# Patient Record
Sex: MALE | Race: BLACK OR AFRICAN AMERICAN | URBAN - METROPOLITAN AREA
[De-identification: names, ages, dates, MRNs, and addresses within clinical notes are randomized per-mention and may not be internally consistent; named-entity substitution may affect disease eponyms.]

---

## 2023-07-05 ENCOUNTER — APPOINTMENT (OUTPATIENT)
Dept: CT IMAGING | Age: 33
End: 2023-07-05
Attending: NURSE PRACTITIONER
Payer: COMMERCIAL

## 2023-07-05 ENCOUNTER — TELEPHONE (OUTPATIENT)
Dept: HEMATOLOGY/ONCOLOGY | Facility: HOSPITAL | Age: 33
End: 2023-07-05

## 2023-07-05 ENCOUNTER — HOSPITAL ENCOUNTER (OUTPATIENT)
Age: 33
Discharge: HOME | End: 2023-07-05
Payer: COMMERCIAL

## 2023-07-05 VITALS
DIASTOLIC BLOOD PRESSURE: 76 MMHG | HEART RATE: 61 BPM | TEMPERATURE: 98 F | BODY MASS INDEX: 29.16 KG/M2 | OXYGEN SATURATION: 100 % | WEIGHT: 220 LBS | SYSTOLIC BLOOD PRESSURE: 121 MMHG | HEIGHT: 73 IN | RESPIRATION RATE: 16 BRPM

## 2023-07-05 DIAGNOSIS — R16.0 MASS OF MULTIPLE SITES OF LIVER: Primary | ICD-10-CM

## 2023-07-05 LAB
#MXD IC: 1 X10ˆ3/UL (ref 0.1–1)
BASOPHILS # BLD AUTO: 0.09 X10(3) UL (ref 0–0.2)
BASOPHILS NFR BLD AUTO: 1.4 %
BUN BLD-MCNC: 14 MG/DL (ref 7–18)
CHLORIDE BLD-SCNC: 103 MMOL/L (ref 98–112)
CO2 BLD-SCNC: 25 MMOL/L (ref 21–32)
CREAT BLD-MCNC: 1 MG/DL
EOSINOPHIL # BLD AUTO: 0.13 X10(3) UL (ref 0–0.7)
EOSINOPHIL NFR BLD AUTO: 2 %
ERYTHROCYTE [DISTWIDTH] IN BLOOD BY AUTOMATED COUNT: 14.4 %
GFR SERPLBLD BASED ON 1.73 SQ M-ARVRAT: 102 ML/MIN/1.73M2 (ref 60–?)
GLUCOSE BLD-MCNC: 88 MG/DL (ref 70–99)
HCT VFR BLD AUTO: 34.8 %
HCT VFR BLD AUTO: 37.1 %
HCT VFR BLD CALC: 38 %
HGB BLD-MCNC: 11.4 G/DL
HGB BLD-MCNC: 11.4 G/DL
IMM GRANULOCYTES # BLD AUTO: 0.01 X10(3) UL (ref 0–1)
IMM GRANULOCYTES NFR BLD: 0.2 %
ISTAT IONIZED CALCIUM FOR CHEM 8: 1.31 MMOL/L (ref 1.12–1.32)
LYMPHOCYTES # BLD AUTO: 1.8 X10ˆ3/UL (ref 1–4)
LYMPHOCYTES # BLD AUTO: 1.93 X10(3) UL (ref 1–4)
LYMPHOCYTES NFR BLD AUTO: 27.8 %
LYMPHOCYTES NFR BLD AUTO: 30.1 %
MCH RBC QN AUTO: 26 PG (ref 26–34)
MCH RBC QN AUTO: 26.6 PG (ref 26–34)
MCHC RBC AUTO-ENTMCNC: 30.7 G/DL (ref 31–37)
MCHC RBC AUTO-ENTMCNC: 32.8 G/DL (ref 31–37)
MCV RBC AUTO: 81.1 FL
MCV RBC AUTO: 84.5 FL (ref 80–100)
MIXED CELL %: 15 %
MONOCYTES # BLD AUTO: 0.83 X10(3) UL (ref 0.1–1)
MONOCYTES NFR BLD AUTO: 12.9 %
NEUTROPHILS # BLD AUTO: 3.43 X10 (3) UL (ref 1.5–7.7)
NEUTROPHILS # BLD AUTO: 3.43 X10(3) UL (ref 1.5–7.7)
NEUTROPHILS # BLD AUTO: 3.7 X10ˆ3/UL (ref 1.5–7.7)
NEUTROPHILS NFR BLD AUTO: 53.4 %
NEUTROPHILS NFR BLD AUTO: 57.2 %
PLATELET # BLD AUTO: 352 10(3)UL (ref 150–450)
POCT BILIRUBIN URINE: NEGATIVE
POCT BLOOD URINE: NEGATIVE
POCT GLUCOSE URINE: NEGATIVE MG/DL
POCT KETONE URINE: NEGATIVE MG/DL
POCT LEUKOCYTE ESTERASE URINE: NEGATIVE
POCT NITRITE URINE: NEGATIVE
POCT PH URINE: 6 (ref 5–8)
POCT PROTEIN URINE: NEGATIVE MG/DL
POCT SPECIFIC GRAVITY URINE: 1.02
POCT URINE CLARITY: CLEAR
POCT URINE COLOR: YELLOW
POCT UROBILINOGEN URINE: 1 MG/DL
POTASSIUM BLD-SCNC: 4 MMOL/L (ref 3.6–5.1)
RBC # BLD AUTO: 4.29 X10(6)UL
RBC # BLD AUTO: 4.39 X10ˆ6/UL
SODIUM BLD-SCNC: 141 MMOL/L (ref 136–145)
WBC # BLD AUTO: 6.4 X10(3) UL (ref 4–11)
WBC # BLD AUTO: 6.5 X10ˆ3/UL (ref 4–11)

## 2023-07-05 PROCEDURE — 96360 HYDRATION IV INFUSION INIT: CPT

## 2023-07-05 PROCEDURE — 96361 HYDRATE IV INFUSION ADD-ON: CPT

## 2023-07-05 PROCEDURE — 99205 OFFICE O/P NEW HI 60 MIN: CPT

## 2023-07-05 PROCEDURE — 80047 BASIC METABLC PNL IONIZED CA: CPT

## 2023-07-05 PROCEDURE — 81002 URINALYSIS NONAUTO W/O SCOPE: CPT | Performed by: NURSE PRACTITIONER

## 2023-07-05 PROCEDURE — 85025 COMPLETE CBC W/AUTO DIFF WBC: CPT | Performed by: NURSE PRACTITIONER

## 2023-07-05 PROCEDURE — 74177 CT ABD & PELVIS W/CONTRAST: CPT | Performed by: NURSE PRACTITIONER

## 2023-07-05 RX ORDER — SODIUM CHLORIDE 9 MG/ML
1000 INJECTION, SOLUTION INTRAVENOUS ONCE
Status: COMPLETED | OUTPATIENT
Start: 2023-07-05 | End: 2023-07-05

## 2023-07-05 NOTE — DISCHARGE INSTRUCTIONS
As we discussed Dr. Brittani Becker office should be reaching out to the next 1 to 2 days to set up an appointment for further follow-up. If you do not hear from her or the office in that time be sure to call the office. Do not drink alcohol or take any Tylenol.

## 2023-07-05 NOTE — TELEPHONE ENCOUNTER
----- Message from Mel Gomez MD sent at 7/5/2023  2:36 PM CDT -----  Was called by the ED. This jeremy needs to see one of the GI guys.  Thanks

## 2023-07-05 NOTE — ED INITIAL ASSESSMENT (HPI)
C/o gas/bloating with constipation at times and intermittent LUQ abdominal pain since February 2023. Last BM was today. Denies N/V/D and fevers. Using Miralax and Midol at times.

## 2023-07-06 ENCOUNTER — SOCIAL WORK SERVICES (OUTPATIENT)
Dept: HEMATOLOGY/ONCOLOGY | Facility: HOSPITAL | Age: 33
End: 2023-07-06

## 2023-07-06 NOTE — PROGRESS NOTES
SW spoke to pt and his wife, Naina Taylor. The couple report that they purchased an insurance plan, [employer], last evening. The couple did not apply for Medicaid. SW requested Radhames Galeas to contact pt to confirm insurance. CRISTY CroftW   at [address], [facility], [address], [address] [place] [address] Liang  Ph: [phone]. [email or web address]  Fax: [phone]

## 2023-07-07 ENCOUNTER — TELEPHONE (OUTPATIENT)
Dept: HEMATOLOGY/ONCOLOGY | Facility: HOSPITAL | Age: 33
End: 2023-07-07

## 2023-07-20 ENCOUNTER — NURSE NAVIGATOR ENCOUNTER (OUTPATIENT)
Dept: HEMATOLOGY/ONCOLOGY | Facility: HOSPITAL | Age: 33
End: 2023-07-20

## 2023-07-20 ENCOUNTER — OFFICE VISIT (OUTPATIENT)
Dept: HEMATOLOGY/ONCOLOGY | Facility: HOSPITAL | Age: 33
End: 2023-07-20
Attending: INTERNAL MEDICINE
Payer: COMMERCIAL

## 2023-07-20 VITALS
HEART RATE: 67 BPM | BODY MASS INDEX: 27.35 KG/M2 | RESPIRATION RATE: 18 BRPM | TEMPERATURE: 98 F | SYSTOLIC BLOOD PRESSURE: 118 MMHG | WEIGHT: 206.38 LBS | DIASTOLIC BLOOD PRESSURE: 74 MMHG | HEIGHT: 72.99 IN | OXYGEN SATURATION: 99 %

## 2023-07-20 DIAGNOSIS — C80.1 CARCINOMA OF UNKNOWN PRIMARY (HCC): Primary | ICD-10-CM

## 2023-07-20 LAB
AFP-TM SERPL-MCNC: 20.7 NG/ML (ref ?–8)
ALBUMIN SERPL-MCNC: 3.7 G/DL (ref 3.4–5)
ALBUMIN/GLOB SERPL: 0.8 {RATIO} (ref 1–2)
ALP LIVER SERPL-CCNC: 264 U/L
ALT SERPL-CCNC: 79 U/L
ANION GAP SERPL CALC-SCNC: 9 MMOL/L (ref 0–18)
AST SERPL-CCNC: 60 U/L (ref 15–37)
BASOPHILS # BLD AUTO: 0.08 X10(3) UL (ref 0–0.2)
BASOPHILS NFR BLD AUTO: 1.2 %
BILIRUB SERPL-MCNC: 0.5 MG/DL (ref 0.1–2)
BUN BLD-MCNC: 14 MG/DL (ref 7–18)
CALCIUM BLD-MCNC: 10.1 MG/DL (ref 8.5–10.1)
CANCER AG19-9 SERPL-ACNC: 304.3 U/ML (ref ?–37)
CEA SERPL-MCNC: 2288.9 NG/ML (ref ?–5)
CHLORIDE SERPL-SCNC: 107 MMOL/L (ref 98–112)
CO2 SERPL-SCNC: 22 MMOL/L (ref 21–32)
CREAT BLD-MCNC: 0.94 MG/DL
DEPRECATED HBV CORE AB SER IA-ACNC: 455.6 NG/ML
EGFRCR SERPLBLD CKD-EPI 2021: 110 ML/MIN/1.73M2 (ref 60–?)
EOSINOPHIL # BLD AUTO: 0.08 X10(3) UL (ref 0–0.7)
EOSINOPHIL NFR BLD AUTO: 1.2 %
ERYTHROCYTE [DISTWIDTH] IN BLOOD BY AUTOMATED COUNT: 14 %
FASTING STATUS PATIENT QL REPORTED: NO
GLOBULIN PLAS-MCNC: 4.9 G/DL (ref 2.8–4.4)
GLUCOSE BLD-MCNC: 91 MG/DL (ref 70–99)
HBV CORE AB SERPL QL IA: NONREACTIVE
HBV SURFACE AB SER QL: NONREACTIVE
HBV SURFACE AB SERPL IA-ACNC: <3.1 MIU/ML
HBV SURFACE AG SER-ACNC: <0.1 [IU]/L
HBV SURFACE AG SERPL QL IA: NONREACTIVE
HCT VFR BLD AUTO: 35.5 %
HCV AB SERPL QL IA: NONREACTIVE
HGB BLD-MCNC: 11.3 G/DL
IMM GRANULOCYTES # BLD AUTO: 0.01 X10(3) UL (ref 0–1)
IMM GRANULOCYTES NFR BLD: 0.2 %
IRON SATN MFR SERPL: 11 %
IRON SERPL-MCNC: 27 UG/DL
LDH SERPL L TO P-CCNC: 186 U/L
LYMPHOCYTES # BLD AUTO: 2.53 X10(3) UL (ref 1–4)
LYMPHOCYTES NFR BLD AUTO: 39.1 %
MCH RBC QN AUTO: 26.2 PG (ref 26–34)
MCHC RBC AUTO-ENTMCNC: 31.8 G/DL (ref 31–37)
MCV RBC AUTO: 82.2 FL
MONOCYTES # BLD AUTO: 0.73 X10(3) UL (ref 0.1–1)
MONOCYTES NFR BLD AUTO: 11.3 %
NEUTROPHILS # BLD AUTO: 3.04 X10 (3) UL (ref 1.5–7.7)
NEUTROPHILS # BLD AUTO: 3.04 X10(3) UL (ref 1.5–7.7)
NEUTROPHILS NFR BLD AUTO: 47 %
OSMOLALITY SERPL CALC.SUM OF ELEC: 286 MOSM/KG (ref 275–295)
PLATELET # BLD AUTO: 319 10(3)UL (ref 150–450)
POTASSIUM SERPL-SCNC: 3.9 MMOL/L (ref 3.5–5.1)
PROT SERPL-MCNC: 8.6 G/DL (ref 6.4–8.2)
RBC # BLD AUTO: 4.32 X10(6)UL
SODIUM SERPL-SCNC: 138 MMOL/L (ref 136–145)
TIBC SERPL-MCNC: 255 UG/DL (ref 240–450)
TRANSFERRIN SERPL-MCNC: 171 MG/DL (ref 200–360)
WBC # BLD AUTO: 6.5 X10(3) UL (ref 4–11)

## 2023-07-20 PROCEDURE — 99205 OFFICE O/P NEW HI 60 MIN: CPT | Performed by: INTERNAL MEDICINE

## 2023-07-20 NOTE — PROGRESS NOTES
Education Record    Learner:  Patient and Spouse    Disease / Diagnosis: New consult    Barriers / Limitations:  None   Comments:    Method:  Discussion   Comments:    General Topics:  Pain and Plan of care reviewed   Comments:    Outcome:  Shows understanding   Comments:    Here for new consult. Liver masses on CT scan from immediate care. Per him and Kortney Iglesias his wife, his symptoms have been going on since December, where they were told that it was all due to constipation. He is feeling bloated, having abdominal pain, and constipation. He has no significant medical history or family history.

## 2023-07-24 ENCOUNTER — HOSPITAL ENCOUNTER (OUTPATIENT)
Dept: NUCLEAR MEDICINE | Facility: HOSPITAL | Age: 33
Discharge: HOME | End: 2023-07-24
Attending: INTERNAL MEDICINE
Payer: COMMERCIAL

## 2023-07-24 DIAGNOSIS — C80.1 CARCINOMA OF UNKNOWN PRIMARY (HCC): ICD-10-CM

## 2023-07-24 LAB — GLUCOSE BLD-MCNC: 83 MG/DL (ref 70–99)

## 2023-07-24 PROCEDURE — 82962 GLUCOSE BLOOD TEST: CPT

## 2023-07-24 PROCEDURE — 78815 PET IMAGE W/CT SKULL-THIGH: CPT | Performed by: INTERNAL MEDICINE

## 2023-07-25 RX ORDER — IBUPROFEN 200 MG
400 TABLET ORAL EVERY 6 HOURS PRN
COMMUNITY

## 2023-07-27 ENCOUNTER — HOSPITAL ENCOUNTER (OUTPATIENT)
Dept: CT IMAGING | Facility: HOSPITAL | Age: 33
Discharge: HOME | End: 2023-07-27
Attending: INTERNAL MEDICINE
Payer: COMMERCIAL

## 2023-07-27 ENCOUNTER — NURSE ONLY (OUTPATIENT)
Dept: LAB | Facility: HOSPITAL | Age: 33
End: 2023-07-27
Attending: INTERNAL MEDICINE
Payer: COMMERCIAL

## 2023-07-27 VITALS
BODY MASS INDEX: 27.3 KG/M2 | OXYGEN SATURATION: 99 % | TEMPERATURE: 99 F | HEIGHT: 73 IN | HEART RATE: 67 BPM | WEIGHT: 206 LBS | SYSTOLIC BLOOD PRESSURE: 121 MMHG | DIASTOLIC BLOOD PRESSURE: 74 MMHG | RESPIRATION RATE: 14 BRPM

## 2023-07-27 DIAGNOSIS — C80.1 CARCINOMA OF UNKNOWN PRIMARY (HCC): Primary | ICD-10-CM

## 2023-07-27 DIAGNOSIS — C80.1 CARCINOMA OF UNKNOWN PRIMARY (HCC): ICD-10-CM

## 2023-07-27 LAB
INR BLD: 1.08 (ref 0.85–1.16)
PROTHROMBIN TIME: 14 SECONDS (ref 11.6–14.8)

## 2023-07-27 PROCEDURE — 49180 BIOPSY ABDOMINAL MASS: CPT | Performed by: INTERNAL MEDICINE

## 2023-07-27 PROCEDURE — 99152 MOD SED SAME PHYS/QHP 5/>YRS: CPT | Performed by: INTERNAL MEDICINE

## 2023-07-27 PROCEDURE — 77012 CT SCAN FOR NEEDLE BIOPSY: CPT | Performed by: INTERNAL MEDICINE

## 2023-07-27 PROCEDURE — 85610 PROTHROMBIN TIME: CPT

## 2023-07-27 PROCEDURE — 36415 COLL VENOUS BLD VENIPUNCTURE: CPT

## 2023-07-27 RX ORDER — FLUMAZENIL 0.1 MG/ML
0.2 INJECTION INTRAVENOUS AS NEEDED
Status: DISCONTINUED | OUTPATIENT
Start: 2023-07-27 | End: 2023-07-29

## 2023-07-27 RX ORDER — MIDAZOLAM HYDROCHLORIDE 1 MG/ML
INJECTION INTRAMUSCULAR; INTRAVENOUS
Status: COMPLETED
Start: 2023-07-27 | End: 2023-07-27

## 2023-07-27 RX ORDER — MIDAZOLAM HYDROCHLORIDE 1 MG/ML
1 INJECTION INTRAMUSCULAR; INTRAVENOUS EVERY 5 MIN PRN
Status: ACTIVE | OUTPATIENT
Start: 2023-07-27 | End: 2023-07-27

## 2023-07-27 RX ORDER — SODIUM CHLORIDE 9 MG/ML
INJECTION, SOLUTION INTRAVENOUS CONTINUOUS
Status: DISCONTINUED | OUTPATIENT
Start: 2023-07-27 | End: 2023-07-29

## 2023-07-27 RX ORDER — NALOXONE HYDROCHLORIDE 0.4 MG/ML
80 INJECTION, SOLUTION INTRAMUSCULAR; INTRAVENOUS; SUBCUTANEOUS AS NEEDED
Status: DISCONTINUED | OUTPATIENT
Start: 2023-07-27 | End: 2023-07-29

## 2023-07-27 RX ADMIN — MIDAZOLAM HYDROCHLORIDE 1 MG: 1 INJECTION INTRAMUSCULAR; INTRAVENOUS at 08:57:00

## 2023-07-27 RX ADMIN — MIDAZOLAM HYDROCHLORIDE 1 MG: 1 INJECTION INTRAMUSCULAR; INTRAVENOUS at 08:54:00

## 2023-07-27 RX ADMIN — SODIUM CHLORIDE: 9 INJECTION, SOLUTION INTRAVENOUS at 08:43:00

## 2023-07-27 NOTE — DISCHARGE INSTRUCTIONS
[facility] Department of Radiology    Biopsy of any type    Dr. Armando King    Have someone drive you home after your procedure. You may not drive yourself home    [address]    Take things easy for the rest of the day after your biopsy.   You may be sore in the biopsy area for one to five days  DO drink plenty of fluids  DO resume your regular diet  DO keep a bandage on the biopsy site for at least 24 hours  DO NOT take a hot bath or shower for at least 12 hours  DO NOT drive or operative machinery for at least 24 hours  DO NOT smoke for at least 24 hours  DO NOT do any strenuous exercise or lifting for at least 48 hours  DO call your doctor immediately if  You start bleeding  There is any change in color or temperature of the area where the biopsy was performed  You develop increasing pain in shortness of breath

## 2023-07-27 NOTE — IMAGING NOTE
Pt here for image guided abdomen retroperitoneal mass biopsy. Pre procedure vitals checked. IV access to right AC saline lock. 0.9 NS IV initiated to maintain patency. Informed consent obtained by Dr Fiordaliza Anthony. Positioned pt on scanner. Obtained biopsy. Specimen sent to Pathology. Presently denies pain. Tolerated procedure well. Vital signs stable on room air. SBAR report given to [facility]. Transported pt to  2258 accompanied by [employer]. VO/RB Dr. Fiordaliza Anthony: patient may resume Ibuprofen at 21:00 tonight, 7/27/23.

## 2023-08-02 ENCOUNTER — NURSE NAVIGATOR ENCOUNTER (OUTPATIENT)
Dept: HEMATOLOGY/ONCOLOGY | Facility: HOSPITAL | Age: 33
End: 2023-08-02

## 2023-08-02 NOTE — PROGRESS NOTES
[facility] NGS, MMR, PDL-1 sent on specimen from pathology [id number] collected on 7/27/23. No blood draw.

## 2023-08-03 ENCOUNTER — OFFICE VISIT (OUTPATIENT)
Dept: HEMATOLOGY/ONCOLOGY | Facility: HOSPITAL | Age: 33
End: 2023-08-03
Attending: INTERNAL MEDICINE
Payer: COMMERCIAL

## 2023-08-03 VITALS
OXYGEN SATURATION: 100 % | HEART RATE: 63 BPM | HEIGHT: 72.99 IN | TEMPERATURE: 98 F | DIASTOLIC BLOOD PRESSURE: 81 MMHG | RESPIRATION RATE: 18 BRPM | WEIGHT: 200.38 LBS | BODY MASS INDEX: 26.56 KG/M2 | SYSTOLIC BLOOD PRESSURE: 126 MMHG

## 2023-08-03 DIAGNOSIS — C80.1 CARCINOMA OF UNKNOWN PRIMARY (HCC): Primary | ICD-10-CM

## 2023-08-03 DIAGNOSIS — G89.3 CANCER ASSOCIATED PAIN: ICD-10-CM

## 2023-08-03 PROBLEM — Z51.5 PALLIATIVE CARE BY SPECIALIST: Status: ACTIVE | Noted: 2023-08-03

## 2023-08-03 PROCEDURE — 99215 OFFICE O/P EST HI 40 MIN: CPT | Performed by: INTERNAL MEDICINE

## 2023-08-03 NOTE — PROGRESS NOTES
Education Record    Learner:  Patient and Spouse    Disease / Diagnosis: Adenocarcinoma of unknown primary    Barriers / Limitations:  None   Comments:    Method:  Discussion   Comments:    General Topics:  Plan of care reviewed   Comments:    Outcome:  Shows understanding   Comments:    Here for f/u after PET scan and biopsy. Dr. Magdy López is putting in a chemo plan for FOLFOX. We are going to run this through insurance. He will need a PORT placed.

## 2023-08-04 NOTE — PAT NURSING NOTE
PA call with patient. The following instructions were give and sent through his My Chart. He verbalized understanding. Preprocedure Instructions    Visitor Instructions    Adult Patients: [facility] can accompany the patient day of procedure. PreOp Instructions    You are scheduled for: an Interventional Radiology Procedure    Date of Procedure: 08/15/23  Please arrive at 10:30 am    Diet Instructions: Do not eat or drink anything after midnight    Medications: Medications you are allowed to take can be taken with a sip of water the morning of your procedure    Medications to Stop: Hold herbal supplements and vitamins    Skin Prep: Shower with antibacterial soap using a clean washcloth, prior to procedure       Driving After Procedure: If sedation is given, you WILL NOT be able to drive home. You will need a responsible adult  to drive you home. Discharge Teaching: Your nurse will give you specific instructions before discharge; Any questions, please call the physician's office       Park in the [facility] parking lot. Check in at the [place] reception desk. Our  will be there to check you in for your procedure. Please bring your insurance cards and ID with you.  [facility] parking is available starting at 5 am.

## 2023-08-08 PROBLEM — R10.30 LOWER ABDOMINAL PAIN: Status: ACTIVE | Noted: 2023-04-28

## 2023-08-08 PROBLEM — K59.00 CONSTIPATION: Status: ACTIVE | Noted: 2023-04-28

## 2023-08-15 ENCOUNTER — HOSPITAL ENCOUNTER (OUTPATIENT)
Dept: INTERVENTIONAL RADIOLOGY/VASCULAR | Facility: HOSPITAL | Age: 33
Discharge: HOME | End: 2023-08-15
Attending: INTERNAL MEDICINE | Admitting: INTERNAL MEDICINE
Payer: COMMERCIAL

## 2023-08-15 VITALS
TEMPERATURE: 96 F | RESPIRATION RATE: 18 BRPM | SYSTOLIC BLOOD PRESSURE: 124 MMHG | OXYGEN SATURATION: 96 % | DIASTOLIC BLOOD PRESSURE: 82 MMHG | WEIGHT: 204 LBS | BODY MASS INDEX: 27.63 KG/M2 | HEIGHT: 72 IN | HEART RATE: 75 BPM

## 2023-08-15 DIAGNOSIS — C80.1 CARCINOMA OF UNKNOWN PRIMARY (HCC): ICD-10-CM

## 2023-08-15 LAB — INR: 1.1 (ref 0.8–1.3)

## 2023-08-15 PROCEDURE — 76937 US GUIDE VASCULAR ACCESS: CPT | Performed by: RADIOLOGY

## 2023-08-15 PROCEDURE — 36561 INSERT TUNNELED CV CATH: CPT | Performed by: RADIOLOGY

## 2023-08-15 PROCEDURE — 99152 MOD SED SAME PHYS/QHP 5/>YRS: CPT | Performed by: RADIOLOGY

## 2023-08-15 PROCEDURE — 02HV33Z INSERTION OF INFUSION DEVICE INTO SUPERIOR VENA CAVA, PERCUTANEOUS APPROACH: ICD-10-PCS | Performed by: RADIOLOGY

## 2023-08-15 PROCEDURE — 77001 FLUOROGUIDE FOR VEIN DEVICE: CPT | Performed by: RADIOLOGY

## 2023-08-15 PROCEDURE — 85610 PROTHROMBIN TIME: CPT | Performed by: RADIOLOGY

## 2023-08-15 PROCEDURE — 0JH60WZ INSERTION OF TOTALLY IMPLANTABLE VASCULAR ACCESS DEVICE INTO CHEST SUBCUTANEOUS TISSUE AND FASCIA, OPEN APPROACH: ICD-10-PCS | Performed by: RADIOLOGY

## 2023-08-15 RX ORDER — SODIUM CHLORIDE 9 MG/ML
INJECTION, SOLUTION INTRAVENOUS CONTINUOUS
Status: DISCONTINUED | OUTPATIENT
Start: 2023-08-15 | End: 2023-08-15

## 2023-08-15 RX ORDER — LIDOCAINE HYDROCHLORIDE 10 MG/ML
INJECTION, SOLUTION INFILTRATION; PERINEURAL
Status: COMPLETED
Start: 2023-08-15 | End: 2023-08-15

## 2023-08-15 RX ORDER — LIDOCAINE HYDROCHLORIDE AND EPINEPHRINE BITARTRATE 20; .01 MG/ML; MG/ML
INJECTION, SOLUTION SUBCUTANEOUS
Status: COMPLETED
Start: 2023-08-15 | End: 2023-08-15

## 2023-08-15 RX ORDER — HEPARIN SODIUM 5000 [USP'U]/ML
INJECTION, SOLUTION INTRAVENOUS; SUBCUTANEOUS
Status: COMPLETED
Start: 2023-08-15 | End: 2023-08-15

## 2023-08-15 RX ORDER — VANCOMYCIN HYDROCHLORIDE 1 G/20ML
INJECTION, POWDER, LYOPHILIZED, FOR SOLUTION INTRAVENOUS
Status: COMPLETED
Start: 2023-08-15 | End: 2023-08-15

## 2023-08-15 RX ORDER — MIDAZOLAM HYDROCHLORIDE 1 MG/ML
INJECTION INTRAMUSCULAR; INTRAVENOUS
Status: COMPLETED
Start: 2023-08-15 | End: 2023-08-15

## 2023-08-15 RX ORDER — CEFAZOLIN SODIUM 1 G/3ML
INJECTION, POWDER, FOR SOLUTION INTRAMUSCULAR; INTRAVENOUS
Status: COMPLETED
Start: 2023-08-15 | End: 2023-08-15

## 2023-08-15 NOTE — PROGRESS NOTES
Pt s/p port insert in IR. Pt recovered in holding. Right chest and right neck dressing cdi, no bleeding or hematoma. HOB elevated. Pt wife at bedside. Pt ate and drank. Discharge instructions reviewed with pt and wife, copy given. Pt given port ID card. After recovery, pt out of bed with no complaints. IV removed. Pt discharged to [address] via wheelchair by volunteer. Pt left with belongings. Pt wife drove pt home.

## 2023-08-16 ENCOUNTER — OFFICE VISIT (OUTPATIENT)
Dept: HEMATOLOGY/ONCOLOGY | Facility: HOSPITAL | Age: 33
End: 2023-08-16
Attending: INTERNAL MEDICINE
Payer: COMMERCIAL

## 2023-08-16 DIAGNOSIS — C80.1 ADENOCARCINOMA OF UNKNOWN PRIMARY (HCC): Primary | ICD-10-CM

## 2023-08-16 DIAGNOSIS — Z71.89 OTHER SPECIFIED COUNSELING: ICD-10-CM

## 2023-08-16 PROCEDURE — G2212 PROLONG OUTPT/OFFICE VIS: HCPCS | Performed by: CLINICAL NURSE SPECIALIST

## 2023-08-16 PROCEDURE — 99215 OFFICE O/P EST HI 40 MIN: CPT | Performed by: CLINICAL NURSE SPECIALIST

## 2023-08-16 RX ORDER — ONDANSETRON 8 MG/1
8 TABLET, ORALLY DISINTEGRATING ORAL EVERY 8 HOURS PRN
Qty: 30 TABLET | Refills: 3 | Status: SHIPPED | OUTPATIENT
Start: 2023-08-16

## 2023-08-16 NOTE — PROGRESS NOTES
IV Chemotherapy Education    Patient:Earnest Roca     Date: 23   Diagnosis:  Metastatic ca unknown primary   Caregivers present: Wife, brother    Drug names:  FOLFOX    Myelosuppression  Decrease in wbc  Decrease in hgb  Decrease in platelets  Risk of infection  Fatigue  Risk of bleeding  Notify MD/RN of any chills or fever 100.5and above:     Gastrointestinal Toxicities:    Stomatitis, sensitivity or oropharyngeal membranes, dry mouth, thrush  Appropriate oral hygiene  Changes in taste perception   Loss of appetite, possible weightloss  Nausea and vomiting   Use of anti-nausea medications  Diarrhea   Use of Imodium  Constipation  Use of various laxatives      Treatment Effects on Hair:  Alopecia or thinning      Neurotoxicity:  Peripheral neuropathy  Cold sensitivity      Hepatotoxicity  Rise in LFTs    Nephrotoxicity  Rise in serum creatinine  Dehydration  Electrolyte abnormalities    Other  Infusion reaction    Teaching Materials Provided:      [employer] Chemotherapy information sheets  ACS Understanding Chemotherapy Booklet  ACS Nutrition for the Person with Cancer during Treatment / Alvin Joyner information sheet  When to contact the Treatment Team Information Sheet  Side Effect Management [facility] Information sheet    Patient and caregiver were given ample opportunity to ask questions. All questions and concerns addressed. We discussed self care techniques, symptom management, fluid, diet, and activities. Chemotherapy Consent Form signed by the patient. I spent  60 minutes counseling patient regarding the above documented side effects and management, when to call provider and contact information.    Encounter Times  PreChartin minutes    Reviewing/Obtaining:   minutes      Medical Exam:   minutes    Plan:   minutes      Notes: 5 minutes    Counseling/Education: 60 minutes      Referring/Communicating:   minutes    Ind Interpretation:   minutes      Care Coordination:   minutes       My total time spent caring for the patient on the day of the encounter: 70 minutes.          Rajni MEDINA  Nurse Practitioner  [facility] [address] [place] [address]

## 2023-08-17 ENCOUNTER — SOCIAL WORK SERVICES (OUTPATIENT)
Dept: HEMATOLOGY/ONCOLOGY | Facility: HOSPITAL | Age: 33
End: 2023-08-17

## 2023-08-17 ENCOUNTER — OFFICE VISIT (OUTPATIENT)
Dept: HEMATOLOGY/ONCOLOGY | Facility: HOSPITAL | Age: 33
End: 2023-08-17
Attending: INTERNAL MEDICINE
Payer: COMMERCIAL

## 2023-08-17 VITALS
TEMPERATURE: 98 F | DIASTOLIC BLOOD PRESSURE: 77 MMHG | WEIGHT: 193 LBS | HEART RATE: 103 BPM | OXYGEN SATURATION: 98 % | SYSTOLIC BLOOD PRESSURE: 114 MMHG | HEIGHT: 70.98 IN | BODY MASS INDEX: 27.02 KG/M2 | RESPIRATION RATE: 20 BRPM

## 2023-08-17 DIAGNOSIS — G89.3 NEOPLASM RELATED PAIN: ICD-10-CM

## 2023-08-17 DIAGNOSIS — R11.0 NAUSEA: ICD-10-CM

## 2023-08-17 DIAGNOSIS — C80.1 ADENOCARCINOMA OF UNKNOWN PRIMARY (HCC): Primary | ICD-10-CM

## 2023-08-17 DIAGNOSIS — G89.3 NEOPLASM RELATED PAIN: Primary | ICD-10-CM

## 2023-08-17 DIAGNOSIS — Z51.5 PALLIATIVE CARE BY SPECIALIST: ICD-10-CM

## 2023-08-17 LAB
ALBUMIN SERPL-MCNC: 3.4 G/DL (ref 3.4–5)
ALBUMIN/GLOB SERPL: 0.6 {RATIO} (ref 1–2)
ALP LIVER SERPL-CCNC: 401 U/L
ALT SERPL-CCNC: 61 U/L
ANION GAP SERPL CALC-SCNC: 4 MMOL/L (ref 0–18)
AST SERPL-CCNC: 43 U/L (ref 15–37)
BASOPHILS # BLD AUTO: 0.08 X10(3) UL (ref 0–0.2)
BASOPHILS NFR BLD AUTO: 1.1 %
BILIRUB SERPL-MCNC: 0.8 MG/DL (ref 0.1–2)
BUN BLD-MCNC: 17 MG/DL (ref 7–18)
CALCIUM BLD-MCNC: 9.6 MG/DL (ref 8.5–10.1)
CHLORIDE SERPL-SCNC: 102 MMOL/L (ref 98–112)
CO2 SERPL-SCNC: 29 MMOL/L (ref 21–32)
CREAT BLD-MCNC: 1 MG/DL
EGFRCR SERPLBLD CKD-EPI 2021: 101 ML/MIN/1.73M2 (ref 60–?)
EOSINOPHIL # BLD AUTO: 0.09 X10(3) UL (ref 0–0.7)
EOSINOPHIL NFR BLD AUTO: 1.2 %
ERYTHROCYTE [DISTWIDTH] IN BLOOD BY AUTOMATED COUNT: 13.7 %
GLOBULIN PLAS-MCNC: 5.3 G/DL (ref 2.8–4.4)
GLUCOSE BLD-MCNC: 110 MG/DL (ref 70–99)
HCT VFR BLD AUTO: 31.6 %
HGB BLD-MCNC: 9.9 G/DL
IMM GRANULOCYTES # BLD AUTO: 0.02 X10(3) UL (ref 0–1)
IMM GRANULOCYTES NFR BLD: 0.3 %
LYMPHOCYTES # BLD AUTO: 1.38 X10(3) UL (ref 1–4)
LYMPHOCYTES NFR BLD AUTO: 18.2 %
MCH RBC QN AUTO: 25.3 PG (ref 26–34)
MCHC RBC AUTO-ENTMCNC: 31.3 G/DL (ref 31–37)
MCV RBC AUTO: 80.6 FL
MONOCYTES # BLD AUTO: 0.94 X10(3) UL (ref 0.1–1)
MONOCYTES NFR BLD AUTO: 12.4 %
NEUTROPHILS # BLD AUTO: 5.09 X10 (3) UL (ref 1.5–7.7)
NEUTROPHILS # BLD AUTO: 5.09 X10(3) UL (ref 1.5–7.7)
NEUTROPHILS NFR BLD AUTO: 66.8 %
OSMOLALITY SERPL CALC.SUM OF ELEC: 282 MOSM/KG (ref 275–295)
PLATELET # BLD AUTO: 459 10(3)UL (ref 150–450)
POTASSIUM SERPL-SCNC: 3.9 MMOL/L (ref 3.5–5.1)
PROT SERPL-MCNC: 8.7 G/DL (ref 6.4–8.2)
RBC # BLD AUTO: 3.92 X10(6)UL
SODIUM SERPL-SCNC: 135 MMOL/L (ref 136–145)
WBC # BLD AUTO: 7.6 X10(3) UL (ref 4–11)

## 2023-08-17 PROCEDURE — 96375 TX/PRO/DX INJ NEW DRUG ADDON: CPT

## 2023-08-17 PROCEDURE — 99213 OFFICE O/P EST LOW 20 MIN: CPT | Performed by: NURSE PRACTITIONER

## 2023-08-17 PROCEDURE — 99215 OFFICE O/P EST HI 40 MIN: CPT | Performed by: INTERNAL MEDICINE

## 2023-08-17 PROCEDURE — 96411 CHEMO IV PUSH ADDL DRUG: CPT

## 2023-08-17 PROCEDURE — 96368 THER/DIAG CONCURRENT INF: CPT

## 2023-08-17 PROCEDURE — 80053 COMPREHEN METABOLIC PANEL: CPT

## 2023-08-17 PROCEDURE — 96413 CHEMO IV INFUSION 1 HR: CPT

## 2023-08-17 PROCEDURE — 85025 COMPLETE CBC W/AUTO DIFF WBC: CPT

## 2023-08-17 PROCEDURE — 96415 CHEMO IV INFUSION ADDL HR: CPT

## 2023-08-17 RX ORDER — FLUOROURACIL 50 MG/ML
400 INJECTION, SOLUTION INTRAVENOUS ONCE
Status: COMPLETED | OUTPATIENT
Start: 2023-08-17 | End: 2023-08-17

## 2023-08-17 RX ORDER — FLUOROURACIL 50 MG/ML
2400 INJECTION, SOLUTION INTRAVENOUS CONTINUOUS
Status: DISCONTINUED | OUTPATIENT
Start: 2023-08-17 | End: 2023-08-17

## 2023-08-17 RX ORDER — FLUOROURACIL 50 MG/ML
2400 INJECTION, SOLUTION INTRAVENOUS CONTINUOUS
Status: CANCELLED | OUTPATIENT
Start: 2023-08-17

## 2023-08-17 RX ORDER — FLUOROURACIL 50 MG/ML
400 INJECTION, SOLUTION INTRAVENOUS ONCE
Status: CANCELLED | OUTPATIENT
Start: 2023-08-17

## 2023-08-17 RX ADMIN — FLUOROURACIL 5000 MG: 50 INJECTION, SOLUTION INTRAVENOUS at 14:50:00

## 2023-08-17 RX ADMIN — FLUOROURACIL 850 MG: 50 INJECTION, SOLUTION INTRAVENOUS at 14:45:00

## 2023-08-17 NOTE — PROGRESS NOTES
Oncology Nutrition Consultation    Patient Name: Reagan Grant  YOB: 1990  Medical Record Number: [id number]   Account Number: [de-identified]  Dietitian: Isabelle Howell RD, MUNIR    Date of visit: 8/17/2023    Diet Rx: high protein/calorie    Pertinent Dx/PMH: metastatic adenocarcinoma unknown primary    Past Medical History:   Diagnosis Date    Abdominal distention     Back pain     Belching     Black stools     Bloating     Body piercing     Cancer ([address]     metastatic adenocarcinoma- primary site ? Constipation     Decorative tattoo     Fatigue     Flatulence/gas pain/belching     Frequent urination     Hemorrhoids     History of COVID-19 2021    loss of taste. Sx's x 1 week. Not hospitalized. Irregular bowel habits     Loss of appetite     Pain with bowel movements     Shortness of breath     Sleep disturbance     Stool incontinence     Stress     Uncomfortable fullness after meals     Weight loss        TX: FOLFOX    Other pertinent subjective/objective information: diet/sx/wt/activity hx obtained    8/17/23: Pt meeting definition for SEVERE MALNUTRITION in the context of acute illness as evidenced by 13% unplanned wt loss x 6 wks, 6% unplanned wt loss x 1 wk, po intake meeting less than 50% estimated nutrition needs. Pertinent Meds:    Current Outpatient Medications:     ondansetron 8 MG Oral Tablet Dispersible, Take 1 tablet (8 mg total) by mouth every 8 (eight) hours as needed for Nausea., Disp: 30 tablet, Rfl: 3    prochlorperazine (COMPAZINE) 10 mg tablet, Take 1 tablet (10 mg total) by mouth every 6 (six) hours as needed for Nausea., Disp: 30 tablet, Rfl: 3    morphINE ER 15 MG Oral Tab CR, Take 1 tablet (15 mg total) by mouth every 12 (twelve) hours. , Disp: 60 tablet, Rfl: 0    gabapentin 300 MG Oral Cap, Take 1 capsule (300 mg total) by mouth in the morning and 1 capsule (300 mg total) before bedtime. , Disp: 60 capsule, Rfl: 1    HYDROmorphone 2 MG Oral Tab, Take 1 tablet (2 mg total) by mouth every 4 (four) hours as needed for Pain., Disp: 60 tablet, Rfl: 0    Pertinent Labs: noted    Height: 6'1\"            IBW:  +/- 10%    WT HX:   Wt Readings from Last 9 Encounters:  08/17/23 : 87.5 kg (193 lb)  08/04/23 : 92.5 kg (204 lb)  08/09/23 : 92.5 kg (204 lb)  08/08/23 : 96.6 kg (213 lb)  08/03/23 : 90.9 kg (200 lb 6.4 oz)  07/25/23 : 93.4 kg (206 lb)  07/20/23 : 93.6 kg (206 lb 6.4 oz)  07/05/23 : 99.8 kg (220 lb)      Estimated Nutrition Needs: 30-35 kcals/kg = 5432-0119 KCALS/d; 1.5 gms protein/kg = 130 gms/d    Services Provided: Verbal and written ix provided addressing -  importance of nutrition during tx; potential nutrition related side effects of tx and ways to address; simple high protein/calorie recipes; nutrition w/ poor appetite; samples/coupons for Ensure Plus (350 kcals, 16 protein)    Assessment/Plan: RD met w/ this pleasant, 27 y/o male and his wife in tx room today for introduction, assessment and recommendations. Pt w/ reported ~ 50 lb unplanned wt loss since 1/2023 related to c/o constipation, nausea, early satiety, poor appetite, nausea. Pt noted prior to dx practicing heavy wt lifting 6 days/wk for 2.5 hrs q time. Pt noted having protein shake for breakfast, protein bar for lunch (travels w/ work) and balanced dinner; however, po intake has been poor associated w/ sx of dx. Pt noted drinking Ensure High Protein (160 kcals, 16 gms protein, fiber). He noted tolerating liquids better than solids as per sx noted. RD reviewed recommendations as noted reviewing ONS recommended and having small, frequent feeds throughout the day. RD discouraged high fiber diet for now as per c/o early satiety, poor appetite, nausea. Both pt and spouse verbalized understanding of recommendations made. RD will continue to follow throughout tx. Thank you for allowing me to participate in the care of Ivana Massey.      The [facility] makes medical notes like these available to patients in the interest of transparency. Please be advised this is a medical document. Medical documents are intended to carry relevant information, facts as evident, and the clinical opinion of the practitioner. The medical note is intended as peer to peer communication and may appear blunt or direct. It is written in medical language and may contain abbreviations or verbiage that are unfamiliar.

## 2023-08-17 NOTE — PROGRESS NOTES
met with patient and Wife Enedina Councilman in treatment room for introduction and role explanation. No Distress Screen on file. Patient states that he is in pain due to his diagnosis, but was capable of speaking. They are planning on meeting with CAROL Azul today for pain management as well. Patient shared that he is ready for treatment to start. Support provided. Patient lives in [place], [place] with Wife Enedina Councilman. Patient is from [place], and his 6yo Son Chichi Dennis is still there. They are in the process of moving him here, and Enedina Councilman will adopt him as well. They requested a letter to assist in expediting the process. Letter completed and sent to family via [address] Patient shared that his Mother and Sister live in [place], and will be visiting. His Brother Megan Burgos lives in the home with him and his wife to assist as well. Wife works and owns her own salon, so has control over her schedule. Patient was working as , which was a physical job, and has not worked since 2023. They have began the disability process, and asked for a letter of diagnosis to submit as well. Letter also completed and sent to [facility] via [address] Social Determinants of Health assessment completed with patient and no needs identified at this time.  educated on Power of  for [employer], providing document for review; Patient is aware to reach out if they choose to complete. Educated on available resources, providing Social Work Support Packet including [facility], [facility], Transportation, and [address] contacts. Educated on BHI if desired. Contact provided and family is aware to reach out with any needs. Bringing Elizabeth Bag given, which patient was grateful for. Dis Letter:   Jackelin Valle ( 1990) is a patient under my care at [facility] for his diagnosis of Metastatic Adenocarcinoma of Unknown GI Primary.  He is undergoing Chemotherapy Treatment resulting in pain, weakness, fatigue, nausea, loss of endurance, dehydration, and a compromised immune system. Due to this, he is unable to work during his treatment period. Son Letter:   Dodie Arevalo ( 1990) is a patient under my care at [facility] for his diagnosis of Metastatic Adenocarcinoma of Unknown GI Primary. He is undergoing Chemotherapy Treatment. Patient's Son, Alen Barclay (: 2012. Address: [place], [address] [place] [address]) is in the process of traveling and moving here to unite with his family. Please expedite this request if possible.

## 2023-08-17 NOTE — PATIENT INSTRUCTIONS
Zofran (Ondansetron) every 8 hours as needed. Compazine (Prochlorperazine) every 6 hours as needed. Alternate between Zofran and Compazine every 4 hours for the first 48-72 hours. Wean off, but continue taking as needed. Take compazine if needed when you arrive home.  Then as follows:    9/10pm - zofran (anytime after 8pm)  (Compazine during the night if needed)  6am - zofran  10am - compazine  2pm - zofran  6pm - compazine  10pm - zofran

## 2023-08-17 NOTE — PROGRESS NOTES
Pt here for C1D1 FOLFOX. Arrives Via wheelchair, accompanied by Spouse       Oral medications included in this regimen:  no    Patient confirms comprehension of cancer treatment schedule:  yes    Pregnancy screening:  Not applicable    Modifications in dose or schedule:  No    Medications appearance and physical integrity checked by RN.  Chemotherapy IV pump settings verified by 2 RNs:  yes     Frequency of blood return and site check throughout administration: Prior to administration, Every 2-3 ml IVP, and At completion of therapy     Infusion/treatment outcome:  patient tolerated treatment without incident    Education Record    Learner:  Patient and Spouse  Barriers / Limitations:  None  Method:  Brief focused, Discussion, Printed material, and Reinforcement  Education / instructions given:  medications, pain, antinausea management, plan of care  Outcome:  Shows understanding    Discharged Home, Ambulating independently, accompanied by:Spouse    Patient/family verbalized understanding of future appointments: by printed AVS

## 2023-08-17 NOTE — PROGRESS NOTES
Education Record    Learner:  Patient and Spouse    Disease / Diagnosis: Adenocarcinoma of unknown primary    Barriers / Limitations:  None   Comments:    Method:  Discussion   Comments:    General Topics:  Medication, Side effects and symptom management, and Plan of care reviewed   Comments:    Outcome:  Shows understanding   Comments:    Here for C1D1 FOLFOX. He is having some abdominal pain. He has his nausea medications at home. I reviewed how to take them.

## 2023-08-18 ENCOUNTER — TELEPHONE (OUTPATIENT)
Dept: HEMATOLOGY/ONCOLOGY | Facility: HOSPITAL | Age: 33
End: 2023-08-18

## 2023-08-18 NOTE — TELEPHONE ENCOUNTER
Toxicities: C1 D1 Fluorouracil/Leucovorin/Oxaliplatin on 8/17/2023    I attempted to reach Ivana Massey to see how he is feeling after his first treatment. I let a voice mail message asking him to please return my call at his earliest convenience.

## 2023-08-18 NOTE — PROGRESS NOTES
Palliative Care Follow Up Note     Patient Name: Lorna Penn   YOB: 1990   Medical Record Number: [id number]   CSN: [id number]   Date of visit: 8/17/2023     Chief Complaint/Reason for Visit:  Follow up for pain      History of Present Illness:         Lorna Penn is a 28year old male with metastatic cancer receiving chemotherapy. He is here with his wife today. His pain was well controlled using MS Contin and 1 dose of dilaudid until a couple of days ago. He has been nauseated with vomiting having difficulty keeping pain meds down. He has not been utilizing antiemetics. The pain intensified yesterday affecting daily activities and sleep. The pain is deep achy, stabbing and burning pain. He is not finding gabapentin helpful. The dilaudid is helpful and he is tolerating it well. This affects mobility and sleep. He is fatigued from the pan and nausea. Problem List:  Patient Active Problem List:     Adenocarcinoma of unknown primary [facility])     Palliative care by specialist     Lower abdominal pain     Constipation     Medical History:  Past Medical History:   Diagnosis Date    Abdominal distention     Back pain     Belching     Black stools     Bloating     Body piercing     Cancer ([address]     metastatic adenocarcinoma- primary site ? Constipation     Decorative tattoo     Fatigue     Flatulence/gas pain/belching     Frequent urination     Hemorrhoids     History of COVID-19 2021    loss of taste. Sx's x 1 week. Not hospitalized.     Irregular bowel habits     Loss of appetite     Pain with bowel movements     Shortness of breath     Sleep disturbance     Stool incontinence     Stress     Uncomfortable fullness after meals     Weight loss      Surgical History:  Past Surgical History:   Procedure Laterality Date    OTHER SURGICAL HISTORY      abdominal biopsy       Allergies:  No Known Allergies    Palliative Care Social History:    Marital Status: Tala Pereyra: 8 yr son  Living Situation: with family      Medications:  Current Outpatient Medications   Medication Sig Dispense Refill    ondansetron 8 MG Oral Tablet Dispersible Take 1 tablet (8 mg total) by mouth every 8 (eight) hours as needed for Nausea. 30 tablet 3    prochlorperazine (COMPAZINE) 10 mg tablet Take 1 tablet (10 mg total) by mouth every 6 (six) hours as needed for Nausea. 30 tablet 3    morphINE ER 15 MG Oral Tab CR Take 1 tablet (15 mg total) by mouth every 12 (twelve) hours. 60 tablet 0    gabapentin 300 MG Oral Cap Take 1 capsule (300 mg total) by mouth in the morning and 1 capsule (300 mg total) before bedtime. 60 capsule 1    HYDROmorphone 2 MG Oral Tab Take 1 tablet (2 mg total) by mouth every 4 (four) hours as needed for Pain. 60 tablet 0       Review of Systems:  General:  Fatigue. Feels well. Respiratory:  Denies SOB, denies cough  Cardiac:  Denies chest pain, heart palpitations  Abdomen:  Denies constipation, diarrhea. Denies pain. Psych:  No complaints. Sleeping well    Palliative Performance Scale:   90%    Physical Examination:  General: Patient is alert and oriented, not in acute distress. Respiratory: Normal excursions and effort  Cardiac:  No edema  Abdomen: Soft, non tender   Musculoskeletal: Normal gait. Psych:  Mood/Affect appropriate    Advanced Directives Discussed and Completed:     HCPOA/Health Surrogate: There is no completed HCPOA documentation on file in Epic. POLST Discussed and Completed:     Palliative Care:  Discussed how to maximize antiemetics to minimize nausea. He will take zofran prior to morphine if nauseated. He can increase dilaudid use if needed to prevent pain spikes. Better pain control will allow for better sleep, mobility, appetite and less fatigue. Impression/Plan:   1. Neoplasm related pain  MS Contin 15mg Q 12  Dilaudid 2mg Q 4 prn  Gabapentin 300mg BID  Ibuprofen 400mg TID prn    2.  Nausea  Zofran 8mg TID  Compazine 10mg Q 6 prn  Small frequent meals      Planned Follow up: Return in about 1 month (around 9/17/2023). Encounter Times  Reviewing/Obtaining:   minutes    Medical Exam:   minutes      Plan:   5 minutes    Notes: 5 minutes      Counseling/Education: 15 minutes    Care Coordination:   minutes      My total time spent caring for the patient on the day of the encounter: 25 minutes. The [address] [place] [address] makes medical notes like these available to patients in the interest of transparency. Please be advised this is a medical document. Medical documents are intended to carry relevant information, facts as evident, and the clinical opinion of the practitioner. The medical note is intended as peer to peer communication and may appear blunt or direct. It is written in medical language and may contain abbreviations or verbiage that are unfamiliar.         Electronically Signed by:  GEETA Cunningham Outpatient Palliative Nurse Practitioner

## 2023-08-19 ENCOUNTER — NURSE ONLY (OUTPATIENT)
Dept: HEMATOLOGY/ONCOLOGY | Facility: HOSPITAL | Age: 33
End: 2023-08-19
Attending: INTERNAL MEDICINE
Payer: COMMERCIAL

## 2023-08-19 PROCEDURE — 96523 IRRIG DRUG DELIVERY DEVICE: CPT

## 2023-08-19 NOTE — PROGRESS NOTES
Education Record    Learner:  Patient and Family Member    Disease / Michi Thomas disconnect    Barriers / Limitations:  None   Comments:    Method:  Discussion   Comments:    General Topics:  Side effects and symptom management and Plan of care reviewed   Comments:    Outcome:  Shows understanding   Comments:Patient verbalizes that he vomited quite a few times over last two days. States he was able to eat, however. Denies nausea at present time. States he was taking his antinausea medicine. Encouraged patient to try to eat protein high diet as able and continue taking antinausea meds as needed. Patient will call if symptoms worsen. Patient will return on Wednesday for apn day 8 appointment.

## 2023-08-21 ENCOUNTER — OFFICE VISIT (OUTPATIENT)
Dept: HEMATOLOGY/ONCOLOGY | Facility: HOSPITAL | Age: 33
End: 2023-08-21
Attending: INTERNAL MEDICINE
Payer: COMMERCIAL

## 2023-08-21 ENCOUNTER — TELEPHONE (OUTPATIENT)
Dept: HEMATOLOGY/ONCOLOGY | Facility: HOSPITAL | Age: 33
End: 2023-08-21

## 2023-08-21 VITALS
SYSTOLIC BLOOD PRESSURE: 115 MMHG | DIASTOLIC BLOOD PRESSURE: 85 MMHG | TEMPERATURE: 97 F | BODY MASS INDEX: 25.9 KG/M2 | RESPIRATION RATE: 18 BRPM | WEIGHT: 185 LBS | HEIGHT: 70.98 IN | OXYGEN SATURATION: 99 % | HEART RATE: 97 BPM

## 2023-08-21 DIAGNOSIS — T45.1X5A CINV (CHEMOTHERAPY-INDUCED NAUSEA AND VOMITING): Primary | ICD-10-CM

## 2023-08-21 DIAGNOSIS — C80.1 ADENOCARCINOMA OF UNKNOWN PRIMARY (HCC): Primary | ICD-10-CM

## 2023-08-21 DIAGNOSIS — E86.0 DEHYDRATION: ICD-10-CM

## 2023-08-21 DIAGNOSIS — R11.2 CINV (CHEMOTHERAPY-INDUCED NAUSEA AND VOMITING): Primary | ICD-10-CM

## 2023-08-21 DIAGNOSIS — R11.2 CINV (CHEMOTHERAPY-INDUCED NAUSEA AND VOMITING): ICD-10-CM

## 2023-08-21 DIAGNOSIS — T45.1X5A CINV (CHEMOTHERAPY-INDUCED NAUSEA AND VOMITING): ICD-10-CM

## 2023-08-21 LAB
ALBUMIN SERPL-MCNC: 3.5 G/DL (ref 3.4–5)
ALBUMIN/GLOB SERPL: 0.7 {RATIO} (ref 1–2)
ALP LIVER SERPL-CCNC: 320 U/L
ALT SERPL-CCNC: 68 U/L
ANION GAP SERPL CALC-SCNC: 6 MMOL/L (ref 0–18)
AST SERPL-CCNC: 44 U/L (ref 15–37)
BASOPHILS # BLD AUTO: 0.04 X10(3) UL (ref 0–0.2)
BASOPHILS NFR BLD AUTO: 0.6 %
BILIRUB SERPL-MCNC: 1 MG/DL (ref 0.1–2)
BUN BLD-MCNC: 21 MG/DL (ref 7–18)
CALCIUM BLD-MCNC: 9.4 MG/DL (ref 8.5–10.1)
CHLORIDE SERPL-SCNC: 98 MMOL/L (ref 98–112)
CO2 SERPL-SCNC: 28 MMOL/L (ref 21–32)
CREAT BLD-MCNC: 0.93 MG/DL
EGFRCR SERPLBLD CKD-EPI 2021: 111 ML/MIN/1.73M2 (ref 60–?)
EOSINOPHIL # BLD AUTO: 0.1 X10(3) UL (ref 0–0.7)
EOSINOPHIL NFR BLD AUTO: 1.4 %
ERYTHROCYTE [DISTWIDTH] IN BLOOD BY AUTOMATED COUNT: 13.3 %
GLOBULIN PLAS-MCNC: 5.2 G/DL (ref 2.8–4.4)
GLUCOSE BLD-MCNC: 101 MG/DL (ref 70–99)
HCT VFR BLD AUTO: 31.7 %
HGB BLD-MCNC: 10.2 G/DL
IMM GRANULOCYTES # BLD AUTO: 0.02 X10(3) UL (ref 0–1)
IMM GRANULOCYTES NFR BLD: 0.3 %
LYMPHOCYTES # BLD AUTO: 1.28 X10(3) UL (ref 1–4)
LYMPHOCYTES NFR BLD AUTO: 18.1 %
MAGNESIUM SERPL-MCNC: 2.5 MG/DL (ref 1.6–2.6)
MCH RBC QN AUTO: 25.7 PG (ref 26–34)
MCHC RBC AUTO-ENTMCNC: 32.2 G/DL (ref 31–37)
MCV RBC AUTO: 79.8 FL
MONOCYTES # BLD AUTO: 0.26 X10(3) UL (ref 0.1–1)
MONOCYTES NFR BLD AUTO: 3.7 %
NEUTROPHILS # BLD AUTO: 5.37 X10 (3) UL (ref 1.5–7.7)
NEUTROPHILS # BLD AUTO: 5.37 X10(3) UL (ref 1.5–7.7)
NEUTROPHILS NFR BLD AUTO: 75.9 %
OSMOLALITY SERPL CALC.SUM OF ELEC: 277 MOSM/KG (ref 275–295)
PLATELET # BLD AUTO: 431 10(3)UL (ref 150–450)
POTASSIUM SERPL-SCNC: 3.7 MMOL/L (ref 3.5–5.1)
PROT SERPL-MCNC: 8.7 G/DL (ref 6.4–8.2)
RBC # BLD AUTO: 3.97 X10(6)UL
SODIUM SERPL-SCNC: 132 MMOL/L (ref 136–145)
WBC # BLD AUTO: 7.1 X10(3) UL (ref 4–11)

## 2023-08-21 PROCEDURE — 83735 ASSAY OF MAGNESIUM: CPT

## 2023-08-21 PROCEDURE — 96374 THER/PROPH/DIAG INJ IV PUSH: CPT

## 2023-08-21 PROCEDURE — 99215 OFFICE O/P EST HI 40 MIN: CPT | Performed by: CLINICAL NURSE SPECIALIST

## 2023-08-21 PROCEDURE — 85025 COMPLETE CBC W/AUTO DIFF WBC: CPT

## 2023-08-21 PROCEDURE — 96361 HYDRATE IV INFUSION ADD-ON: CPT

## 2023-08-21 PROCEDURE — 80053 COMPREHEN METABOLIC PANEL: CPT

## 2023-08-21 RX ORDER — OLANZAPINE 5 MG/1
5 TABLET ORAL NIGHTLY PRN
Qty: 30 TABLET | Refills: 1 | Status: SHIPPED | OUTPATIENT
Start: 2023-08-21

## 2023-08-21 RX ORDER — SODIUM CHLORIDE 9 MG/ML
INJECTION, SOLUTION INTRAVENOUS AS NEEDED
Status: DISCONTINUED | OUTPATIENT
Start: 2023-08-21 | End: 2023-08-21

## 2023-08-21 RX ORDER — SODIUM CHLORIDE 9 MG/ML
INJECTION, SOLUTION INTRAVENOUS AS NEEDED
Start: 2023-08-21

## 2023-08-21 RX ORDER — SODIUM CHLORIDE 9 MG/ML
INJECTION, SOLUTION INTRAVENOUS AS NEEDED
Status: CANCELLED
Start: 2023-08-21

## 2023-08-21 RX ADMIN — SODIUM CHLORIDE: 9 INJECTION, SOLUTION INTRAVENOUS at 11:56:00

## 2023-08-21 NOTE — TELEPHONE ENCOUNTER
8/17/23 - C1D1 - mFOLFOX 6    Nausea/vomiting/weakness/not able to keep anything down    Nausea - Grade 3 - having nausea and vomiting to where he can not hold anything down including antiemetics. Vomiting - Grade 2  Dehydration - Grade 2 - having lightheadedness and weakness    Denies fevers, no sob or chest pain, denies diarrhea, not eating or drinking, no urinary complaints, bowel moving but less. ACV [address] at [place].

## 2023-08-21 NOTE — PROGRESS NOTES
Pt here for ACV plus labs and poss hydration. Reports vomiting since Saturday after CADD pump DC. Denies any nausea at this time, but states if he tries to eat or drink it will result in vomiting. States he has been taking home anti-emetics with no relief. Denies any abdominal pain or fever. Pt has chronic constipation. APN at chairside for assessment- order received to give hydration and IV anti-emetics. Lab results pending. Labs reviewed with APN, no additional orders received. Pt okay to discharge to home after hydration is complete. Pt tolerated infusion without difficulty. Was able to eat some crackers and drank some orange juice without difficulty. Pt discharged to home with Day 8 f/u with APN on Wed 8/23/23. Pt aware to call tomorrow if no improvement in side effects.      Education Record    Learner:  Patient, spouse    Disease / Diagnosis: adenocarcinoma     Barriers / Limitations:  None   Comments:    Method:  Discussion   Comments:    General Topics:  Plan of care reviewed   Comments:    Outcome:  Shows understanding   Comments:

## 2023-08-21 NOTE — TELEPHONE ENCOUNTER
Pt's wife is calling, states pt has not been able to hold any food or water down since 8/17/2023.  Pt also sent over [employer] message to [employer] yesterday-NL

## 2023-08-23 ENCOUNTER — OFFICE VISIT (OUTPATIENT)
Dept: HEMATOLOGY/ONCOLOGY | Facility: HOSPITAL | Age: 33
End: 2023-08-23
Attending: INTERNAL MEDICINE
Payer: COMMERCIAL

## 2023-08-23 VITALS
BODY MASS INDEX: 25.9 KG/M2 | HEART RATE: 86 BPM | WEIGHT: 185 LBS | TEMPERATURE: 98 F | SYSTOLIC BLOOD PRESSURE: 109 MMHG | HEIGHT: 70.98 IN | DIASTOLIC BLOOD PRESSURE: 73 MMHG | RESPIRATION RATE: 16 BRPM | OXYGEN SATURATION: 100 %

## 2023-08-23 DIAGNOSIS — T45.1X5A CINV (CHEMOTHERAPY-INDUCED NAUSEA AND VOMITING): ICD-10-CM

## 2023-08-23 DIAGNOSIS — G89.3 NEOPLASM RELATED PAIN: ICD-10-CM

## 2023-08-23 DIAGNOSIS — C80.1 ADENOCARCINOMA OF UNKNOWN PRIMARY (HCC): Primary | ICD-10-CM

## 2023-08-23 DIAGNOSIS — R11.2 CINV (CHEMOTHERAPY-INDUCED NAUSEA AND VOMITING): ICD-10-CM

## 2023-08-23 PROCEDURE — 99214 OFFICE O/P EST MOD 30 MIN: CPT | Performed by: NURSE PRACTITIONER

## 2023-08-23 NOTE — PROGRESS NOTES
Patient presents with: Follow - Up: APN assessment - Day 8    Pt is here for follow up - Day 8 folfox. He was seen earlier in the week for an ACV for N,V,anorexia. Feels much better today; nausea improved as well as pain. Some acid reflux but no vomiting. Energy is improving each day. No fever or chills.     Education Record    Learner:  Patient, Spouse, and Family Member    Disease / Diagnosis: adenocarcinoma of unknown primary    Barriers / Limitations:  None   Comments:    Method:  Brief focused   Comments:    General Topics:  Diet, Medication, Pain, Side effects and symptom management, and Plan of care reviewed   Comments:    Outcome:  Shows understanding   Comments:

## 2023-08-26 ENCOUNTER — HOSPITAL ENCOUNTER (EMERGENCY)
Facility: HOSPITAL | Age: 33
Discharge: HOME | End: 2023-08-26
Attending: STUDENT IN AN ORGANIZED HEALTH CARE EDUCATION/TRAINING PROGRAM
Payer: COMMERCIAL

## 2023-08-26 VITALS
OXYGEN SATURATION: 97 % | RESPIRATION RATE: 26 BRPM | HEIGHT: 72 IN | SYSTOLIC BLOOD PRESSURE: 122 MMHG | HEART RATE: 96 BPM | BODY MASS INDEX: 25.06 KG/M2 | WEIGHT: 185 LBS | DIASTOLIC BLOOD PRESSURE: 88 MMHG | TEMPERATURE: 99 F

## 2023-08-26 DIAGNOSIS — R11.2 SEVERE NAUSEA AND VOMITING: Primary | ICD-10-CM

## 2023-08-26 DIAGNOSIS — C80.1 CANCER (HCC): ICD-10-CM

## 2023-08-26 DIAGNOSIS — E86.0 DEHYDRATION: ICD-10-CM

## 2023-08-26 LAB
ALBUMIN SERPL-MCNC: 3.3 G/DL (ref 3.4–5)
ALBUMIN/GLOB SERPL: 0.7 {RATIO} (ref 1–2)
ALP LIVER SERPL-CCNC: 291 U/L
ALT SERPL-CCNC: 56 U/L
ANION GAP SERPL CALC-SCNC: 7 MMOL/L (ref 0–18)
AST SERPL-CCNC: 37 U/L (ref 15–37)
BASOPHILS # BLD AUTO: 0.03 X10(3) UL (ref 0–0.2)
BASOPHILS NFR BLD AUTO: 0.4 %
BILIRUB SERPL-MCNC: 1 MG/DL (ref 0.1–2)
BUN BLD-MCNC: 15 MG/DL (ref 7–18)
CALCIUM BLD-MCNC: 9.2 MG/DL (ref 8.5–10.1)
CHLORIDE SERPL-SCNC: 100 MMOL/L (ref 98–112)
CO2 SERPL-SCNC: 27 MMOL/L (ref 21–32)
CREAT BLD-MCNC: 0.77 MG/DL
EGFRCR SERPLBLD CKD-EPI 2021: 120 ML/MIN/1.73M2 (ref 60–?)
EOSINOPHIL # BLD AUTO: 0.01 X10(3) UL (ref 0–0.7)
EOSINOPHIL NFR BLD AUTO: 0.1 %
ERYTHROCYTE [DISTWIDTH] IN BLOOD BY AUTOMATED COUNT: 13.6 %
GLOBULIN PLAS-MCNC: 4.8 G/DL (ref 2.8–4.4)
GLUCOSE BLD-MCNC: 103 MG/DL (ref 70–99)
HCT VFR BLD AUTO: 30.6 %
HGB BLD-MCNC: 10.2 G/DL
IMM GRANULOCYTES # BLD AUTO: 0.02 X10(3) UL (ref 0–1)
IMM GRANULOCYTES NFR BLD: 0.3 %
LIPASE SERPL-CCNC: 49 U/L (ref 13–75)
LYMPHOCYTES # BLD AUTO: 1.16 X10(3) UL (ref 1–4)
LYMPHOCYTES NFR BLD AUTO: 15.6 %
MAGNESIUM SERPL-MCNC: 2.2 MG/DL (ref 1.6–2.6)
MCH RBC QN AUTO: 25.6 PG (ref 26–34)
MCHC RBC AUTO-ENTMCNC: 33.3 G/DL (ref 31–37)
MCV RBC AUTO: 76.7 FL
MONOCYTES # BLD AUTO: 0.86 X10(3) UL (ref 0.1–1)
MONOCYTES NFR BLD AUTO: 11.6 %
NEUTROPHILS # BLD AUTO: 5.36 X10 (3) UL (ref 1.5–7.7)
NEUTROPHILS # BLD AUTO: 5.36 X10(3) UL (ref 1.5–7.7)
NEUTROPHILS NFR BLD AUTO: 72 %
OSMOLALITY SERPL CALC.SUM OF ELEC: 279 MOSM/KG (ref 275–295)
PLATELET # BLD AUTO: 420 10(3)UL (ref 150–450)
POTASSIUM SERPL-SCNC: 4.1 MMOL/L (ref 3.5–5.1)
PROT SERPL-MCNC: 8.1 G/DL (ref 6.4–8.2)
RBC # BLD AUTO: 3.99 X10(6)UL
SODIUM SERPL-SCNC: 134 MMOL/L (ref 136–145)
WBC # BLD AUTO: 7.4 X10(3) UL (ref 4–11)

## 2023-08-26 PROCEDURE — 96374 THER/PROPH/DIAG INJ IV PUSH: CPT

## 2023-08-26 PROCEDURE — 99284 EMERGENCY DEPT VISIT MOD MDM: CPT

## 2023-08-26 PROCEDURE — 83735 ASSAY OF MAGNESIUM: CPT | Performed by: STUDENT IN AN ORGANIZED HEALTH CARE EDUCATION/TRAINING PROGRAM

## 2023-08-26 PROCEDURE — 96375 TX/PRO/DX INJ NEW DRUG ADDON: CPT

## 2023-08-26 PROCEDURE — 85025 COMPLETE CBC W/AUTO DIFF WBC: CPT | Performed by: STUDENT IN AN ORGANIZED HEALTH CARE EDUCATION/TRAINING PROGRAM

## 2023-08-26 PROCEDURE — 80053 COMPREHEN METABOLIC PANEL: CPT | Performed by: STUDENT IN AN ORGANIZED HEALTH CARE EDUCATION/TRAINING PROGRAM

## 2023-08-26 PROCEDURE — 96361 HYDRATE IV INFUSION ADD-ON: CPT

## 2023-08-26 PROCEDURE — 83690 ASSAY OF LIPASE: CPT | Performed by: STUDENT IN AN ORGANIZED HEALTH CARE EDUCATION/TRAINING PROGRAM

## 2023-08-26 PROCEDURE — 99285 EMERGENCY DEPT VISIT HI MDM: CPT

## 2023-08-26 RX ORDER — ONDANSETRON 2 MG/ML
4 INJECTION INTRAMUSCULAR; INTRAVENOUS ONCE
Status: COMPLETED | OUTPATIENT
Start: 2023-08-26 | End: 2023-08-26

## 2023-08-26 RX ORDER — HYDROMORPHONE HYDROCHLORIDE 2 MG/1
1 TABLET ORAL ONCE
Status: COMPLETED | OUTPATIENT
Start: 2023-08-26 | End: 2023-08-26

## 2023-08-26 RX ORDER — DIPHENHYDRAMINE HYDROCHLORIDE 50 MG/ML
25 INJECTION INTRAMUSCULAR; INTRAVENOUS ONCE
Status: COMPLETED | OUTPATIENT
Start: 2023-08-26 | End: 2023-08-26

## 2023-08-26 RX ORDER — METOCLOPRAMIDE HYDROCHLORIDE 5 MG/ML
10 INJECTION INTRAMUSCULAR; INTRAVENOUS ONCE
Status: COMPLETED | OUTPATIENT
Start: 2023-08-26 | End: 2023-08-26

## 2023-08-26 RX ORDER — HYDROMORPHONE HYDROCHLORIDE 1 MG/ML
0.5 INJECTION, SOLUTION INTRAMUSCULAR; INTRAVENOUS; SUBCUTANEOUS ONCE
Status: COMPLETED | OUTPATIENT
Start: 2023-08-26 | End: 2023-08-26

## 2023-08-26 RX ORDER — ONDANSETRON 4 MG/1
4 TABLET, ORALLY DISINTEGRATING ORAL EVERY 4 HOURS PRN
Qty: 10 TABLET | Refills: 0 | Status: SHIPPED | OUTPATIENT
Start: 2023-08-26 | End: 2023-09-02

## 2023-08-26 NOTE — ED INITIAL ASSESSMENT (HPI)
To te ED wit c/o vomiting, unable to keep anything down. Symptoms started this morning. Last chemo was the 17th. Told by his hem/onc MDs to go to the ED for IV fluids.

## 2023-08-28 ENCOUNTER — TELEPHONE (OUTPATIENT)
Dept: HEMATOLOGY/ONCOLOGY | Facility: HOSPITAL | Age: 33
End: 2023-08-28

## 2023-08-28 NOTE — TELEPHONE ENCOUNTER
Called patient's wife, Brian Cody, as patient did not answer his cell phone. She tells me Tiki Garland has been doing much better since receiving IVF and anti-emetics in the emergency room over the weekend. She tells me Tiki Garland feels like the nausea medications make him even more nausea and he ends up throwing up the pills. The taste from the zofran ODT is also nauseating but he has been attempting to take. He continues to focus on hydration and bland foods to calm his stomach. He is drinking ginger tea and ginger ale and trying to eat in small quantities. Advised to continue to focus on diligent PO hydration and if not eating to incorporate protein shakes with small snack. They are aware that should he not be able to get any PO intake, he is to call the cancer center for additional IVF and IV anti-emetics. No further questions at close of call.

## 2023-09-01 RX ORDER — FLUOROURACIL 50 MG/ML
2400 INJECTION, SOLUTION INTRAVENOUS CONTINUOUS
Status: CANCELLED | OUTPATIENT
Start: 2023-09-05

## 2023-09-01 RX ORDER — FLUOROURACIL 50 MG/ML
400 INJECTION, SOLUTION INTRAVENOUS ONCE
Status: CANCELLED | OUTPATIENT
Start: 2023-09-05

## 2023-09-05 ENCOUNTER — OFFICE VISIT (OUTPATIENT)
Dept: HEMATOLOGY/ONCOLOGY | Facility: HOSPITAL | Age: 33
End: 2023-09-05
Attending: INTERNAL MEDICINE
Payer: COMMERCIAL

## 2023-09-05 ENCOUNTER — NURSE NAVIGATOR ENCOUNTER (OUTPATIENT)
Dept: HEMATOLOGY/ONCOLOGY | Facility: HOSPITAL | Age: 33
End: 2023-09-05

## 2023-09-05 VITALS
WEIGHT: 170.81 LBS | HEART RATE: 113 BPM | BODY MASS INDEX: 23.91 KG/M2 | OXYGEN SATURATION: 95 % | DIASTOLIC BLOOD PRESSURE: 82 MMHG | TEMPERATURE: 98 F | RESPIRATION RATE: 18 BRPM | SYSTOLIC BLOOD PRESSURE: 117 MMHG | HEIGHT: 70.98 IN

## 2023-09-05 DIAGNOSIS — C80.1 ADENOCARCINOMA OF UNKNOWN PRIMARY (HCC): Primary | ICD-10-CM

## 2023-09-05 DIAGNOSIS — R11.2 CINV (CHEMOTHERAPY-INDUCED NAUSEA AND VOMITING): ICD-10-CM

## 2023-09-05 DIAGNOSIS — T45.1X5A CINV (CHEMOTHERAPY-INDUCED NAUSEA AND VOMITING): ICD-10-CM

## 2023-09-05 LAB
ALBUMIN SERPL-MCNC: 3.2 G/DL (ref 3.4–5)
ALBUMIN/GLOB SERPL: 0.5 {RATIO} (ref 1–2)
ALP LIVER SERPL-CCNC: 322 U/L
ALT SERPL-CCNC: 49 U/L
ANION GAP SERPL CALC-SCNC: 8 MMOL/L (ref 0–18)
AST SERPL-CCNC: 41 U/L (ref 15–37)
BASOPHILS # BLD AUTO: 0.06 X10(3) UL (ref 0–0.2)
BASOPHILS NFR BLD AUTO: 1.2 %
BILIRUB SERPL-MCNC: 1 MG/DL (ref 0.1–2)
BUN BLD-MCNC: 19 MG/DL (ref 7–18)
CALCIUM BLD-MCNC: 9.5 MG/DL (ref 8.5–10.1)
CHLORIDE SERPL-SCNC: 93 MMOL/L (ref 98–112)
CO2 SERPL-SCNC: 31 MMOL/L (ref 21–32)
CREAT BLD-MCNC: 1.03 MG/DL
EGFRCR SERPLBLD CKD-EPI 2021: 98 ML/MIN/1.73M2 (ref 60–?)
EOSINOPHIL # BLD AUTO: 0.06 X10(3) UL (ref 0–0.7)
EOSINOPHIL NFR BLD AUTO: 1.2 %
ERYTHROCYTE [DISTWIDTH] IN BLOOD BY AUTOMATED COUNT: 14.5 %
GLOBULIN PLAS-MCNC: 6 G/DL (ref 2.8–4.4)
GLUCOSE BLD-MCNC: 112 MG/DL (ref 70–99)
HCT VFR BLD AUTO: 31.1 %
HGB BLD-MCNC: 10.1 G/DL
IMM GRANULOCYTES # BLD AUTO: 0.02 X10(3) UL (ref 0–1)
IMM GRANULOCYTES NFR BLD: 0.4 %
LYMPHOCYTES # BLD AUTO: 1.7 X10(3) UL (ref 1–4)
LYMPHOCYTES NFR BLD AUTO: 34 %
MCH RBC QN AUTO: 24.8 PG (ref 26–34)
MCHC RBC AUTO-ENTMCNC: 32.5 G/DL (ref 31–37)
MCV RBC AUTO: 76.4 FL
MONOCYTES # BLD AUTO: 1.11 X10(3) UL (ref 0.1–1)
MONOCYTES NFR BLD AUTO: 22.2 %
NEUTROPHILS # BLD AUTO: 2.05 X10 (3) UL (ref 1.5–7.7)
NEUTROPHILS # BLD AUTO: 2.05 X10(3) UL (ref 1.5–7.7)
NEUTROPHILS NFR BLD AUTO: 41 %
OSMOLALITY SERPL CALC.SUM OF ELEC: 277 MOSM/KG (ref 275–295)
PLATELET # BLD AUTO: 457 10(3)UL (ref 150–450)
POTASSIUM SERPL-SCNC: 3.3 MMOL/L (ref 3.5–5.1)
PROT SERPL-MCNC: 9.2 G/DL (ref 6.4–8.2)
RBC # BLD AUTO: 4.07 X10(6)UL
SODIUM SERPL-SCNC: 132 MMOL/L (ref 136–145)
WBC # BLD AUTO: 5 X10(3) UL (ref 4–11)

## 2023-09-05 PROCEDURE — 96411 CHEMO IV PUSH ADDL DRUG: CPT

## 2023-09-05 PROCEDURE — 96361 HYDRATE IV INFUSION ADD-ON: CPT

## 2023-09-05 PROCEDURE — 85025 COMPLETE CBC W/AUTO DIFF WBC: CPT

## 2023-09-05 PROCEDURE — 96368 THER/DIAG CONCURRENT INF: CPT

## 2023-09-05 PROCEDURE — 80053 COMPREHEN METABOLIC PANEL: CPT

## 2023-09-05 PROCEDURE — 96375 TX/PRO/DX INJ NEW DRUG ADDON: CPT

## 2023-09-05 PROCEDURE — 99215 OFFICE O/P EST HI 40 MIN: CPT | Performed by: INTERNAL MEDICINE

## 2023-09-05 PROCEDURE — 96413 CHEMO IV INFUSION 1 HR: CPT

## 2023-09-05 PROCEDURE — 96415 CHEMO IV INFUSION ADDL HR: CPT

## 2023-09-05 PROCEDURE — 96367 TX/PROPH/DG ADDL SEQ IV INF: CPT

## 2023-09-05 RX ORDER — FLUOROURACIL 50 MG/ML
400 INJECTION, SOLUTION INTRAVENOUS ONCE
Status: COMPLETED | OUTPATIENT
Start: 2023-09-05 | End: 2023-09-05

## 2023-09-05 RX ORDER — POTASSIUM CHLORIDE 750 MG/1
40 TABLET, EXTENDED RELEASE ORAL ONCE
Status: CANCELLED
Start: 2023-09-05 | End: 2023-09-05

## 2023-09-05 RX ORDER — POLYETHYLENE GLYCOL 3350 17 G/17G
17 POWDER, FOR SOLUTION ORAL 3 TIMES DAILY
COMMUNITY

## 2023-09-05 RX ORDER — FLUOROURACIL 50 MG/ML
2400 INJECTION, SOLUTION INTRAVENOUS CONTINUOUS
Status: DISCONTINUED | OUTPATIENT
Start: 2023-09-05 | End: 2023-09-05

## 2023-09-05 RX ORDER — SODIUM CHLORIDE 9 MG/ML
INJECTION, SOLUTION INTRAVENOUS AS NEEDED
Status: DISCONTINUED | OUTPATIENT
Start: 2023-09-05 | End: 2023-09-05

## 2023-09-05 RX ORDER — SODIUM CHLORIDE 9 MG/ML
INJECTION, SOLUTION INTRAVENOUS AS NEEDED
Status: CANCELLED
Start: 2023-09-05

## 2023-09-05 RX ORDER — POTASSIUM CHLORIDE 20 MEQ/1
40 TABLET, EXTENDED RELEASE ORAL ONCE
Status: COMPLETED | OUTPATIENT
Start: 2023-09-05 | End: 2023-09-05

## 2023-09-05 RX ADMIN — POTASSIUM CHLORIDE 40 MEQ: 20 TABLET, EXTENDED RELEASE ORAL at 11:29:00

## 2023-09-05 RX ADMIN — FLUOROURACIL 850 MG: 50 INJECTION, SOLUTION INTRAVENOUS at 14:40:00

## 2023-09-05 RX ADMIN — SODIUM CHLORIDE: 9 INJECTION, SOLUTION INTRAVENOUS at 10:51:00

## 2023-09-05 RX ADMIN — FLUOROURACIL 5000 MG: 50 INJECTION, SOLUTION INTRAVENOUS at 14:45:00

## 2023-09-05 NOTE — PROGRESS NOTES
Oncology Nutrition Consultation     Patient Name: Wally Galeano  YOB: 1990  Medical Record Number: [id number]            Account Number: [de-identified]  Dietitian: Юлия Salinas RD, LDN     Date of visit: 9/5/2023     Diet Rx: high protein/calorie     Pertinent Dx/PMH: metastatic adenocarcinoma unknown primary          Past Medical History:   Diagnosis Date    Abdominal distention      Back pain      Belching      Black stools      Bloating      Body piercing      Cancer ([address]       metastatic adenocarcinoma- primary site ? Constipation      Decorative tattoo      Fatigue      Flatulence/gas pain/belching      Frequent urination      Hemorrhoids      History of COVID-19 2021     loss of taste. Sx's x 1 week. Not hospitalized. Irregular bowel habits      Loss of appetite      Pain with bowel movements      Shortness of breath      Sleep disturbance      Stool incontinence      Stress      Uncomfortable fullness after meals      Weight loss           TX: FOLFOX     Other pertinent subjective/objective information: diet/sx/wt/activity hx obtained     9/5/23: Pt meeting definition for SEVERE MALNUTRITION in the context of acute illness as evidenced by 8% unplanned wt loss x 2 wks, 17% unplanned wt loss x 4 wks, po intake meeting less than 50% estimated nutrition needs. Pertinent Meds:     Current Outpatient Medications:     ondansetron 8 MG Oral Tablet Dispersible, Take 1 tablet (8 mg total) by mouth every 8 (eight) hours as needed for Nausea., Disp: 30 tablet, Rfl: 3    prochlorperazine (COMPAZINE) 10 mg tablet, Take 1 tablet (10 mg total) by mouth every 6 (six) hours as needed for Nausea., Disp: 30 tablet, Rfl: 3    morphINE ER 15 MG Oral Tab CR, Take 1 tablet (15 mg total) by mouth every 12 (twelve) hours. , Disp: 60 tablet, Rfl: 0    gabapentin 300 MG Oral Cap, Take 1 capsule (300 mg total) by mouth in the morning and 1 capsule (300 mg total) before bedtime. , Disp: 60 capsule, Rfl: 1 HYDROmorphone 2 MG Oral Tab, Take 1 tablet (2 mg total) by mouth every 4 (four) hours as needed for Pain., Disp: 60 tablet, Rfl: 0     Pertinent Labs: noted     Height: 6'1\"              IBW:  +/- 10%     WT HX:   09/05/23:  77.5 kg (170 lb 12.8 oz)  08/23/23:  83.9 kg (185 lb)  08/17/23 : 87.5 kg (193 lb)  08/04/23 : 92.5 kg (204 lb)  08/09/23 : 92.5 kg (204 lb)  08/08/23 : 96.6 kg (213 lb)  08/03/23 : 90.9 kg (200 lb 6.4 oz)  07/25/23 : 93.4 kg (206 lb)  07/20/23 : 93.6 kg (206 lb 6.4 oz)  07/05/23 : 99.8 kg (220 lb)        Estimated Nutrition Needs: 30-35 kcals/kg = 0044-2364 KCALS/d; 1.5 gms protein/kg = 130 gms/d     Assessment/Plan: RD f/u w/ pt, his wife and mother in tx area today. Pt appeared to be sleeping but was aroused when RD was speaking w/ spouse. Pt noted appetite is improving a bit. He doesn't care for Ensure alone but will take when mixed into food. Spouse noted N/V appears under better control; however, pt now suffering from constipation noting w/o BM x 7 days. Spouse noted pt takes Miralax bid; however, oncologist advised to add Senna then Ducolax if constipation continues. RD offered support and will continue to follow throughout tx. The [facility] makes medical notes like these available to patients in the interest of transparency. Please be advised this is a medical document. Medical documents are intended to carry relevant information, facts as evident, and the clinical opinion of the practitioner. The medical note is intended as peer to peer communication and may appear blunt or direct. It is written in medical language and may contain abbreviations or verbiage that are unfamiliar.

## 2023-09-05 NOTE — PROGRESS NOTES
Pt here for C2D1 FOLFOX. Arrives Ambulating independently, accompanied by Spouse and Family member       Oral medications included in this regimen:  no    Patient confirms comprehension of cancer treatment schedule:  yes    Pregnancy screening:  Not applicable    Modifications in dose or schedule:  No    Medications appearance and physical integrity checked by RN.  Chemotherapy IV pump settings verified by 2 RNs:  yes     Frequency of blood return and site check throughout administration: Prior to administration, Every 2-3 ml IVP, and At completion of therapy     Infusion/treatment outcome:  patient tolerated treatment without incident    Education Record    Learner:  Patient and Spouse  Barriers / Limitations:  None  Method:  Brief focused, Discussion, Printed material, and Reinforcement  Education / instructions given:  medications, side effects, antinausea management, plan of care  Outcome:  Shows understanding    Discharged Home, Ambulating independently, accompanied by:Spouse    Patient/family verbalized understanding of future appointments: by [facility]

## 2023-09-05 NOTE — PROGRESS NOTES
Outpatient Oncology Care Plan  Problem list:  loss of appetite  pain  constipation  nausea and vomiting  nutrition    Problems related to:    chemotherapy    Interventions:  provided nutrition support  promoted rest  provided general teaching    Expected outcomes:  nutritional needs met  patient supported/coping well  safe in environment  symptoms relieved/minimized    Progress towards outcome:  making progress    Education Record    Learner:  Patient, Spouse, and Family Member  Barriers / Limitations:  None  Method:  Discussion  Outcome:  Shows understanding  Comments:    Here for C2D1 FOLFOX. Had a lot of issues with nausea/vomiting. Was in the ER. Having constipation- it's been about 7 days since a BM. Taking miralax 3x/daily without relief. Going to start senna 2 tabs BID and call us Friday if he doesn't have a BM. Nausea has improved a bit. Still vomiting. Eating a little bit more now. New pain left upper abdominal quadrant. Rectum is also sore.

## 2023-09-07 ENCOUNTER — OFFICE VISIT (OUTPATIENT)
Dept: HEMATOLOGY/ONCOLOGY | Facility: HOSPITAL | Age: 33
End: 2023-09-07
Attending: INTERNAL MEDICINE
Payer: COMMERCIAL

## 2023-09-07 VITALS
TEMPERATURE: 98 F | OXYGEN SATURATION: 98 % | SYSTOLIC BLOOD PRESSURE: 114 MMHG | HEART RATE: 86 BPM | DIASTOLIC BLOOD PRESSURE: 76 MMHG | RESPIRATION RATE: 16 BRPM

## 2023-09-07 DIAGNOSIS — C80.1 ADENOCARCINOMA OF UNKNOWN PRIMARY (HCC): Primary | ICD-10-CM

## 2023-09-07 PROCEDURE — 96374 THER/PROPH/DIAG INJ IV PUSH: CPT

## 2023-09-07 PROCEDURE — 96361 HYDRATE IV INFUSION ADD-ON: CPT

## 2023-09-07 RX ORDER — SODIUM CHLORIDE 9 MG/ML
INJECTION, SOLUTION INTRAVENOUS AS NEEDED
Status: DISCONTINUED | OUTPATIENT
Start: 2023-09-07 | End: 2023-09-07

## 2023-09-07 RX ORDER — SODIUM CHLORIDE 9 MG/ML
INJECTION, SOLUTION INTRAVENOUS AS NEEDED
Start: 2023-09-07

## 2023-09-07 RX ORDER — METOCLOPRAMIDE HYDROCHLORIDE 5 MG/ML
10 INJECTION INTRAMUSCULAR; INTRAVENOUS ONCE
Status: COMPLETED | OUTPATIENT
Start: 2023-09-07 | End: 2023-09-07

## 2023-09-07 RX ORDER — METOCLOPRAMIDE HYDROCHLORIDE 5 MG/ML
10 INJECTION INTRAMUSCULAR; INTRAVENOUS ONCE
Status: CANCELLED
Start: 2023-09-07 | End: 2023-09-07

## 2023-09-07 RX ADMIN — METOCLOPRAMIDE HYDROCHLORIDE 10 MG: 5 INJECTION INTRAMUSCULAR; INTRAVENOUS at 15:14:00

## 2023-09-07 RX ADMIN — SODIUM CHLORIDE: 9 INJECTION, SOLUTION INTRAVENOUS at 14:53:00

## 2023-09-07 NOTE — PROGRESS NOTES
Pt here for CADD pump disconnect. Reports nausea since last night at 11 pm. Pt states he did not take olanzapine as directed last night. Last dose of zofran ODT and compazine were taken at approx 8 am this morning. Pt has had 3 small episodes of vomiting. Reports that he is eating and drinking small amounts, but still has not produced BM despite taking senna 2 tabs BID as directed by MD on 9/5. Pt states he is passing small amounts of gas; bowel sounds present upon auscultation. Discussed POC with Dr. Dary Barroso. Orders received to give dose of Reglan 10 mg IVP today with hydration. Reinforced with pt the need to take olanzapine at night as directed. Pt will try Doculax suppository tonight and gave instructions to call clinic tomorrow with update if no BM is produced by noon/1pm. Pt also aware to call in the morning if unable to manage nausea overnight- may need to return for supportive hydration. Pt and his spouse verbalized understanding. Will follow up tomorrow PRN.      Education Record    Learner:  Patient, spouse     Disease / Diagnosis: adenocarcinoma of unknown primary site    Barriers / Limitations:  None   Comments:    Method:  Discussion   Comments:    General Topics:  Plan of care reviewed   Comments:    Outcome:  Shows understanding   Comments:

## 2023-09-11 ENCOUNTER — TELEPHONE (OUTPATIENT)
Dept: HEMATOLOGY/ONCOLOGY | Facility: HOSPITAL | Age: 33
End: 2023-09-11

## 2023-09-11 NOTE — TELEPHONE ENCOUNTER
Spouse called patient over the weekend was very weak and non responsive and taken to [facility] and is still there.

## 2023-09-12 ENCOUNTER — APPOINTMENT (OUTPATIENT)
Dept: HEMATOLOGY/ONCOLOGY | Facility: HOSPITAL | Age: 33
End: 2023-09-12
Attending: INTERNAL MEDICINE
Payer: COMMERCIAL

## 2023-09-19 ENCOUNTER — APPOINTMENT (OUTPATIENT)
Dept: HEMATOLOGY/ONCOLOGY | Facility: HOSPITAL | Age: 33
End: 2023-09-19
Attending: INTERNAL MEDICINE
Payer: COMMERCIAL

## 2023-10-04 ENCOUNTER — TELEPHONE (OUTPATIENT)
Dept: HEMATOLOGY/ONCOLOGY | Facility: HOSPITAL | Age: 33
End: 2023-10-04

## 2023-10-04 NOTE — TELEPHONE ENCOUNTER
James Olivier calling about  Caroline  has scheduled appointment doesn't know if they need it? T hey would like to speak with Dr. Peter Palmer  to see if anything could be done  other than radiation.  Thanks [employer]

## 2023-10-10 ENCOUNTER — VIRTUAL PHONE E/M (OUTPATIENT)
Dept: HEMATOLOGY/ONCOLOGY | Facility: HOSPITAL | Age: 33
End: 2023-10-10
Attending: INTERNAL MEDICINE
Payer: COMMERCIAL

## 2023-10-10 DIAGNOSIS — C80.1 ADENOCARCINOMA OF UNKNOWN PRIMARY (HCC): Primary | ICD-10-CM

## 2023-10-10 PROCEDURE — G2252 BRIEF CHKIN BY MD/QHP, 11-20: HCPCS | Performed by: INTERNAL MEDICINE

## 2023-10-10 NOTE — PROGRESS NOTES
Hematology/Oncology Clinic Follow Up Visit    Patient Name: Mona Marcelo  Medical Record Number: [id number]    YOB: 1990   PCP: None Pcp    Reason for Consultation:  Mona Marcelo was seen today for the diagnosis of metastatic adenocarcinoma of unknown GI primary    PHONE VISIT    Oncologic History:  - 7 months of progressive abdominal discomfort, constipation, weight loss of at least 30 lbs  7/5/23: CT with diffuse hepatic metastatic disease, extensive peritoneal carcinomatosis  7/24/23: PET/CT with diffuse hypermetabolic uptake throughout peritoneal cavity and omentum, diffuse hepatic disease  7/27/23: CT biopsy of omental mass positive for well-differentiated adenocarcinoma, IHC consistent with upper GI primary, less consistent with lower GI primary. Guardant +KRAS, otherwise no targetable mutations. SUZI. Tempus with KRAS G12V, TP53 mutation. CDKN2A loss, CDKN2B loss, MTAP loss  8/17/23: C1D1 FOLFOX  9/5/23: C2D1 FOLFOX    History of Present Illness:      27 y/o M PMH metastatic adenocarcinoma of unknown GI primary who presents for follow up,    - phone visit; pt in hospice. Increasing pain requirement, ongoing weakness  - wife Alberto Adkins wants to know if there are any non-chemo options. She continues to be distraught about Earnest's decline    Past Medical History:  Past Medical History:   Diagnosis Date    Abdominal distention     Back pain     Belching     Black stools     Bloating     Body piercing     Cancer ([address]     metastatic adenocarcinoma- primary site ? Constipation     Decorative tattoo     Fatigue     Flatulence/gas pain/belching     Frequent urination     Hemorrhoids     History of COVID-19 2021    loss of taste. Sx's x 1 week. Not hospitalized.     Irregular bowel habits     Loss of appetite     Pain with bowel movements     Shortness of breath     Sleep disturbance     Stool incontinence     Stress     Uncomfortable fullness after meals     Weight loss      Past Surgical History:   Procedure Laterality Date    OTHER SURGICAL HISTORY      abdominal biopsy       Home Medications:  No outpatient medications have been marked as taking for the 10/10/23 encounter (Virtual Phone E/M) with Bridgette Loyola MD.      Allergies:   No Known Allergies    Psychosocial History:  Social History    Socioeconomic History      Marital status:       Spouse name: Not on file      Number of children: Not on file      Years of education: Not on file      Highest education level: Not on file    Occupational History      Not on file    Tobacco Use      Smoking status: Former        Packs/day: 0.20        Years: 2.50        Additional pack years: 0.00        Total pack years: 0.50        Types: Cigarettes        Passive exposure: Never      Smokeless tobacco: Never      Tobacco comments: Quit 11/2022.  Pt states smoked 1-2 cigarettes a day    Vaping Use      Vaping Use: Former        Substances: Nicotine    Substance and Sexual Activity      Alcohol use: Not Currently      Drug use: Never      Sexual activity: Not on file    Other Topics      Concerns:        Not on file    Social History Narrative      Not on file    Social Determinants of Health  Financial Resource Strain: Not on file  Food Insecurity: Not on file  Transportation Needs: Not on file  Physical Activity: Not on file  Stress: Not on file  Social Connections: Not on file  Housing Stability: Not on file    Family Medical History:  Family History   Problem Relation Age of Onset    Prostate Cancer Other        Review of Systems:  A 10-point ROS was done with pertinent positives and negative per the HPI    Vital Signs:  Height: --  Weight: --  BSA (Calculated - sq m): --  Pulse: --  BP: --  Temp: --  Do Not Use - Resp Rate: --  SpO2: --    Wt Readings from Last 6 Encounters:  09/05/23 : 77.5 kg (170 lb 12.8 oz)  08/26/23 : 83.9 kg (185 lb)  08/23/23 : 83.9 kg (185 lb)  08/21/23 : 83.9 kg (185 lb)  08/17/23 : 87.5 kg (193 lb)  08/04/23 : 92.5 kg (204 lb)      Laboratory:  Lab Results   Component Value Date    WBC 5.0 09/05/2023    WBC 7.4 08/26/2023    WBC 7.1 08/21/2023    HGB 10.1 (L) 09/05/2023    HGB 10.2 (L) 08/26/2023    HGB 10.2 (L) 08/21/2023    HCT 31.1 (L) 09/05/2023    MCV 76.4 (L) 09/05/2023    MCH 24.8 (L) 09/05/2023    MCHC 32.5 09/05/2023    RDW 14.5 09/05/2023    .0 (H) 09/05/2023    .0 08/26/2023    .0 08/21/2023     Lab Results   Component Value Date     (H) 09/05/2023    BUN 19 (H) 09/05/2023    CREATSERUM 1.03 09/05/2023    CREATSERUM 0.77 08/26/2023    CREATSERUM 0.93 08/21/2023    ANIONGAP 8 09/05/2023    CA 9.5 09/05/2023    OSMOCALC 277 09/05/2023    ALKPHO 322 (H) 09/05/2023    AST 41 (H) 09/05/2023    ALT 49 09/05/2023    BILT 1.0 09/05/2023    TP 9.2 (H) 09/05/2023    ALB 3.2 (L) 09/05/2023    GLOBULIN 6.0 (H) 09/05/2023     (L) 09/05/2023    K 3.3 (L) 09/05/2023    CL 93 (L) 09/05/2023    CO2 31.0 09/05/2023     Lab Results   Component Value Date    INR 1.1 08/15/2023       Impression & Plan:     Metastatic adenocarcinoma of unknown GI primary  - I previously discussed incurable prognosis and palliative intent of treatment  - given unknown primary and IHC consistent with upper GI, referred to Dr Gema Quezada for upper endoscopy but pt has not completed this due to insurance issues  - Guardant +KRAS, otherwise no targetable mutations. SUZI. Tempus with KRAS G12V, TP53 mutation. CDKN2A loss, CDKN2B loss, MTAP loss  - sent germline testing given young age; negative  - s/p 2 cycles of FOLFOX. Pt with ongoing decline without benefit from chemo. He enrolled in hospice which is appropriate. Discussed with wife that there is non chemo options unfortunately. She understands. Will remain available as needed    Cancer associated pain  - pain mgt per hospice    Follow up as needed    5 mins on phone call. 10 mins reviewing OSH documentation.     Adeel Elder  Hematology/Medical Oncology  [facility]